# Patient Record
Sex: MALE | Race: BLACK OR AFRICAN AMERICAN | Employment: FULL TIME | URBAN - NONMETROPOLITAN AREA
[De-identification: names, ages, dates, MRNs, and addresses within clinical notes are randomized per-mention and may not be internally consistent; named-entity substitution may affect disease eponyms.]

---

## 2022-08-17 ENCOUNTER — APPOINTMENT (OUTPATIENT)
Dept: CT IMAGING | Age: 27
End: 2022-08-17

## 2022-08-17 ENCOUNTER — HOSPITAL ENCOUNTER (EMERGENCY)
Age: 27
Discharge: HOME | End: 2022-08-18
Attending: EMERGENCY MEDICINE

## 2022-08-17 DIAGNOSIS — K21.9 GASTROESOPHAGEAL REFLUX DISEASE WITHOUT ESOPHAGITIS: Primary | ICD-10-CM

## 2022-08-17 LAB — SARS-COV-2, NAAT: NOT  DETECTED

## 2022-08-17 PROCEDURE — 99283 EMERGENCY DEPT VISIT LOW MDM: CPT

## 2022-08-17 PROCEDURE — 87635 SARS-COV-2 COVID-19 AMP PRB: CPT

## 2022-08-17 PROCEDURE — 6370000000 HC RX 637 (ALT 250 FOR IP): Performed by: EMERGENCY MEDICINE

## 2022-08-17 RX ORDER — PANTOPRAZOLE SODIUM 40 MG/1
40 TABLET, DELAYED RELEASE ORAL ONCE
Status: COMPLETED | OUTPATIENT
Start: 2022-08-17 | End: 2022-08-17

## 2022-08-17 RX ORDER — ONDANSETRON 4 MG/1
4 TABLET, ORALLY DISINTEGRATING ORAL ONCE
Status: COMPLETED | OUTPATIENT
Start: 2022-08-17 | End: 2022-08-17

## 2022-08-17 RX ADMIN — PANTOPRAZOLE SODIUM 40 MG: 40 TABLET, DELAYED RELEASE ORAL at 22:14

## 2022-08-17 RX ADMIN — LIDOCAINE HYDROCHLORIDE: 20 SOLUTION ORAL; TOPICAL at 22:14

## 2022-08-17 RX ADMIN — ONDANSETRON 4 MG: 4 TABLET, ORALLY DISINTEGRATING ORAL at 22:14

## 2022-08-17 ASSESSMENT — ENCOUNTER SYMPTOMS
NAUSEA: 1
WHEEZING: 0
BLOOD IN STOOL: 0
EYE DISCHARGE: 0
COUGH: 0
EYE PAIN: 0
SORE THROAT: 0
EYE ITCHING: 0
ABDOMINAL PAIN: 1
DIARRHEA: 0
CHEST TIGHTNESS: 0
SHORTNESS OF BREATH: 0
CONSTIPATION: 0
PHOTOPHOBIA: 0
VOMITING: 0
EYE REDNESS: 0
VOICE CHANGE: 0
BACK PAIN: 0
SINUS PRESSURE: 0
ABDOMINAL DISTENTION: 0
CHOKING: 0
RHINORRHEA: 0
TROUBLE SWALLOWING: 0

## 2022-08-17 NOTE — LETTER
[facility] [address] [address] EMERGENCY DEPT  [address] [place] [id number]  Phone: [phone]               August 18, 2022    Patient: Any Collins   YOB: 1995   Date of Visit: 8/17/2022       To Whom It May Concern:    Any Collins was seen and treated in our emergency department on 8/17/2022. He may return to work on 08/19/2022.       Sincerely,       Noemi Zarate RN per  [address] [place] [address]        Signature:__________________________________

## 2022-08-18 VITALS
WEIGHT: 180 LBS | HEART RATE: 67 BPM | HEIGHT: 72 IN | BODY MASS INDEX: 24.38 KG/M2 | TEMPERATURE: 98.5 F | SYSTOLIC BLOOD PRESSURE: 159 MMHG | DIASTOLIC BLOOD PRESSURE: 106 MMHG | OXYGEN SATURATION: 99 % | RESPIRATION RATE: 16 BRPM

## 2022-08-18 RX ORDER — PANTOPRAZOLE SODIUM 40 MG/1
40 TABLET, DELAYED RELEASE ORAL
Qty: 30 TABLET | Refills: 0 | Status: SHIPPED | OUTPATIENT
Start: 2022-08-18

## 2022-08-18 RX ORDER — ONDANSETRON 4 MG/1
4 TABLET, ORALLY DISINTEGRATING ORAL 3 TIMES DAILY PRN
Qty: 21 TABLET | Refills: 0 | Status: SHIPPED | OUTPATIENT
Start: 2022-08-18

## 2022-08-18 RX ORDER — CALCIUM CARBONATE 200(500)MG
1 TABLET,CHEWABLE ORAL DAILY
Qty: 30 TABLET | Refills: 0 | Status: SHIPPED | OUTPATIENT
Start: 2022-08-18 | End: 2022-09-17

## 2022-08-18 NOTE — ED PROVIDER NOTES
[facility]  eMERGENCY dEPARTMENT eNCOUnter          CHIEF COMPLAINT       Chief Complaint   Patient presents with    Abdominal Pain         Nurses Notes reviewed and I agree except as noted in the HPI. HISTORY OF PRESENT ILLNESS    J Carlos Keen is a 32 y.o. male who presents abdominal pain. Apparently this is been going on for a week. Patient states that today in the upper quadrants bilaterally and centrally. Patient states he has not had any nausea or vomiting. He has not had diarrhea or constipation. No blood in the stool no blood in the urine. Denies any fevers chills or sweats. Patient states he does not have any trauma. Patient states that he has had no fevers chills sweats. Patient is otherwise resting comfortably on the cot no apparent distress no other physical complaints at this time. He did discuss with me the fact that he does not want lab work done because he does not like needles. I did discuss with him at bedside to fully work this patient up and that he would need to have lab work done and I would like to get a CAT scan. He stated he would think about it. REVIEW OF SYSTEMS     Review of Systems   Constitutional:  Negative for activity change, appetite change, diaphoresis, fatigue and unexpected weight change. HENT:  Negative for congestion, ear discharge, ear pain, hearing loss, rhinorrhea, sinus pressure, sore throat, trouble swallowing and voice change. Eyes:  Negative for photophobia, pain, discharge, redness and itching. Respiratory:  Negative for cough, choking, chest tightness, shortness of breath and wheezing. Cardiovascular:  Negative for chest pain, palpitations and leg swelling. Gastrointestinal:  Positive for abdominal pain and nausea. Negative for abdominal distention, blood in stool, constipation, diarrhea and vomiting. Endocrine: Negative for polydipsia, polyphagia and polyuria.    Genitourinary:  Negative for decreased urine volume, difficulty urinating, dysuria, enuresis, frequency, hematuria, penile pain and urgency. Musculoskeletal:  Negative for arthralgias, back pain, gait problem, myalgias, neck pain and neck stiffness. Skin:  Negative for pallor and rash. Allergic/Immunologic: Negative for immunocompromised state. Neurological:  Negative for dizziness, tremors, seizures, syncope, facial asymmetry, weakness, light-headedness, numbness and headaches. Hematological:  Negative for adenopathy. Does not bruise/bleed easily. Psychiatric/Behavioral:  Negative for agitation, hallucinations and suicidal ideas. The patient is not nervous/anxious. PAST MEDICAL HISTORY    has no past medical history on file. SURGICAL HISTORY      has no past surgical history on file. CURRENT MEDICATIONS       Previous Medications    No medications on file       ALLERGIES     has No Known Allergies. FAMILY HISTORY     has no family status information on file. family history is not on file. SOCIAL HISTORY      reports that he has been smoking cigarettes. He has never used smokeless tobacco. He reports current alcohol use. He reports current drug use. Drug: Marijuana Maurita Handsome). PHYSICAL EXAM     INITIAL VITALS:  height is 6' (1.829 m) and weight is 180 lb (81.6 kg). His oral temperature is 98.5 °F (36.9 °C). His blood pressure is 141/89 (abnormal) and his pulse is 60. His respiration is 18 and oxygen saturation is 99%. Physical Exam  Vitals and nursing note reviewed. Constitutional:       General: He is not in acute distress. Appearance: He is well-developed. He is not diaphoretic. HENT:      Head: Normocephalic and atraumatic. Right Ear: External ear normal.      Left Ear: External ear normal.      Nose: Nose normal.   Eyes:      General: Lids are normal. No scleral icterus. Right eye: No discharge. Left eye: No discharge. Conjunctiva/sclera: Conjunctivae normal.      Right eye: No exudate.      Left eye: No exudate. Pupils: Pupils are equal, round, and reactive to light. Neck:      Thyroid: No thyromegaly. Vascular: No JVD. Trachea: No tracheal deviation. Cardiovascular:      Rate and Rhythm: Normal rate and regular rhythm. Pulses: Normal pulses. Carotid pulses are 2+ on the right side and 2+ on the left side. Radial pulses are 2+ on the right side and 2+ on the left side. Femoral pulses are 2+ on the right side and 2+ on the left side. Popliteal pulses are 2+ on the right side and 2+ on the left side. Dorsalis pedis pulses are 2+ on the right side and 2+ on the left side. Posterior tibial pulses are 2+ on the right side and 2+ on the left side. Heart sounds: Normal heart sounds, S1 normal and S2 normal. No murmur heard. No friction rub. No gallop. Pulmonary:      Effort: Pulmonary effort is normal. No respiratory distress. Breath sounds: Normal breath sounds. No stridor. No decreased breath sounds, wheezing, rhonchi or rales. Chest:      Chest wall: No tenderness. Abdominal:      General: Abdomen is flat. Bowel sounds are normal. There is no distension. Palpations: Abdomen is soft. There is no mass. Tenderness: abdominal tenderness in the epigastric area There is no right CVA tenderness, left CVA tenderness, guarding or rebound. Negative signs include Hernandez's sign, Rovsing's sign, McBurney's sign, psoas sign and obturator sign. Hernia: No hernia is present. Musculoskeletal:         General: No tenderness. Normal range of motion. Cervical back: Normal range of motion and neck supple. Normal range of motion. Right lower leg: No edema. Left lower leg: No edema. Lymphadenopathy:      Cervical: No cervical adenopathy. Skin:     General: Skin is warm and dry. Capillary Refill: Capillary refill takes less than 2 seconds. Findings: No bruising, ecchymosis, lesion or rash.    Neurological: General: No focal deficit present. Mental Status: He is alert and oriented to person, place, and time. Mental status is at baseline. Cranial Nerves: No cranial nerve deficit. Sensory: No sensory deficit. Motor: No weakness. Coordination: Coordination normal.      Gait: Gait normal.      Deep Tendon Reflexes: Reflexes are normal and symmetric. Reflexes normal.   Psychiatric:         Speech: Speech normal.         Behavior: Behavior normal.         Thought Content: Thought content normal.         Judgment: Judgment normal.         DIFFERENTIAL DIAGNOSIS:   Gastritis gastroenteritis GERD pancreatitis duodenitis cholecystitis choledocholithiasis    DIAGNOSTIC RESULTS     EKG: All EKG's are interpreted by the Emergency Department Physician who either signs or Co-signs this chart in the absence of a cardiologist.  None    RADIOLOGY: non-plain film images(s) such as CT, Ultrasound and MRI are read by the radiologist.  CT ABDOMEN PELVIS W IV CONTRAST Additional Contrast? None    (Results Pending)     Declined by patient    LABS:   Labs Reviewed   COVID-19, RAPID   CBC WITH AUTO DIFFERENTIAL   BASIC METABOLIC PANEL   HEPATIC FUNCTION PANEL   LIPASE   TROPONIN   MAGNESIUM   ETHANOL   URINE DRUG SCREEN   URINALYSIS WITH REFLEX TO CULTURE       EMERGENCY DEPARTMENT COURSE:   Vitals:    Vitals:    08/17/22 2209   BP: (!) 141/89   Pulse: 60   Resp: 18   Temp: 98.5 °F (36.9 °C)   TempSrc: Oral   SpO2: 99%   Weight: 180 lb (81.6 kg)   Height: 6' (1.829 m)     Patient was assessed at bedside labs and imaging are ordered. As stated above I had a discussion with the patient and the need to do labs to fully assess this patient's abdominal complaint. Also he would need an IV for a CT scan. Here today the patient stated that he was not sick enough to feel that he needed any labs he did have a COVID test.  He did not want a CAT scan. Here today COVID is negative.   Went back and discussed the fact that the only thing I can tell him was that he did not have COVID. He did report feeling better after the medications were given to him. He had received Zofran Protonix and a GI cocktail. I can only assume that he has either GERD or gastritis. I told him that at bedside. I told him that if he started to feel worse in any way he should come back to the emergency room and reconsider getting the labs and imaging. Patient understood and agreed with the plan. Patient is subsequently discharged home in stable condition. Patient has what appears to be GERD. Patient is instructed to take medications as prescribed. He is instructed to follow-up with a primary care physician to do so within the next 1 to 2 days. He is instructed return to the nearest emergency room immediately for any new or worsening complaints. CRITICAL CARE:   None    CONSULTS:  None    PROCEDURES:  None    FINAL IMPRESSION      1.  Gastroesophageal reflux disease without esophagitis          DISPOSITION/PLAN   Discharge    PATIENT REFERRED TO:  [address]  Call in 2 days      DISCHARGE MEDICATIONS:  New Prescriptions    CALCIUM CARBONATE (ANTACID) 500 MG CHEWABLE TABLET    Take 1 tablet by mouth daily    ONDANSETRON (ZOFRAN-ODT) 4 MG DISINTEGRATING TABLET    Take 1 tablet by mouth 3 times daily as needed for Nausea or Vomiting    PANTOPRAZOLE (PROTONIX) 40 MG TABLET    Take 1 tablet by mouth every morning (before breakfast)       (Please note that portions of this note were completed with a voice recognition program.  Efforts were made to edit the dictations but occasionally words are mis-transcribed.)    DO Victor Hugo Walton DO  08/18/22 0003

## 2022-08-18 NOTE — DISCHARGE INSTR - COC
Continuity of Care Form    Patient Name: No Ledbetter   :  1995  MRN:  [id number]    Admit date:  2022  Discharge date:  ***    Code Status Order: No Order   Advance Directives:     Admitting Physician:  No admitting provider for patient encounter. PCP: No primary care provider on file. Discharging Nurse: [facility] Unit/Room#: 05/005A  Discharging Unit Phone Number: ***    Emergency Contact:   No emergency contact information on file. Past Surgical History:  No past surgical history on file. Immunization History: There is no immunization history on file for this patient. Active Problems: There is no problem list on file for this patient. Isolation/Infection:   Isolation            No Isolation          Patient Infection Status       Infection Onset Added Last Indicated Last Indicated By Review Planned Expiration Resolved Resolved By    COVID-19 (Rule Out) 22 COVID-19, Rapid (Ordered) 22              Nurse Assessment:  Last Vital Signs: There were no vitals taken for this visit. Last documented pain score (0-10 scale):    Last Weight:   Wt Readings from Last 1 Encounters:   No data found for Wt     Mental Status:  {IP PT MENTAL STATUS:65659}    IV Access:  { MARYAM IV ACCESS:610604270}    Nursing Mobility/ADLs:  Walking   {CHP DME YQOB:535029822}  Transfer  {CHP DME ATMD:150433607}  Bathing  {CHP DME YXLF:391144913}  Dressing  {CHP DME XQSF:679428161}  Toileting  {CHP DME ZWWN:737377274}  Feeding  {P DME AKSK:365247448}  Med Admin  {P DME CRF}  Med Delivery   { MARYAM MED Delivery:067753926}    Wound Care Documentation and Therapy:        Elimination:  Continence:    Bowel: {YES / NORMAN:98342}  Bladder: {YES / KH:85908}  Urinary Catheter: {Urinary Catheter:089674435}   Colostomy/Ileostomy/Ileal Conduit: {YES / ES:39972}       Date of Last BM: ***  No intake or output data in the 24 hours ending 22  No intake/output data recorded.     Safety Concerns:     [address]CO Safety Concerns:763669196}    Impairments/Disabilities:      [address] MARYAM Impairments/Disabilities:710453664}    Nutrition Therapy:  Current Nutrition Therapy:   [address] Font[address] MARYAM Diet List:037825746}    Routes of Feeding: {CHP DME Other Feedings:083803755}  Liquids: {Slp liquid thickness:78202}  Daily Fluid Restriction: {CHP DME Yes amt example:351196768}  Last Modified Barium Swallow with Video (Video Swallowing Test): {Done Not Done QFPE:891738060}    Treatments at the Time of Hospital Discharge:   Respiratory Treatments: ***  Oxygen Therapy:  {Therapy; copd oxygen:89836}  Ventilator:    { CC Vent LVD}    Rehab Therapies: {THERAPEUTIC INTERVENTION:4157758358}  Weight Bearing Status/Restrictions: Shahab Fontaine  Weight Bearin}  Other Medical Equipment (for information only, NOT a DME order):  {EQUIPMENT:656214140}  Other Treatments: ***    Patient's personal belongings (please select all that are sent with patient):  {CHP DME Belongings:376850923}    RN SIGNATURE:  {Esignature:791104651}    CASE MANAGEMENT/SOCIAL WORK SECTION    Inpatient Status Date: ***    Readmission Risk Assessment Score:  Readmission Risk              Risk of Unplanned Readmission:  0           Discharging to Facility/ Agency   Name:   Address:  Phone:  Fax:    Dialysis Facility (if applicable)   Name:  Address:  Dialysis Schedule:  Phone:  Fax:    / signature: {Esignature:063140632}    PHYSICIAN SECTION    Prognosis: {Prognosis:7930580341}    Condition at Discharge: [address] Fontaine Patient Condition:605434054}    Rehab Potential (if transferring to Rehab): {Prognosis:1585515967}    Recommended Labs or Other Treatments After Discharge: ***    Physician Certification: I certify the above information and transfer of [facility]  is necessary for the continuing treatment of the diagnosis listed and that he requires {Admit to Appropriate Level of Care:00430} for {GREATER/LESS:351822267} 30 days.      Update Admission H&P: {CHP DME Changes in KZCOI:528901649}    PHYSICIAN SIGNATURE:  {Esignature:324006111}

## 2022-08-18 NOTE — ED TRIAGE NOTES
Pt presents to the ED via triage with c/o abdominal pain. Pt states pain has been going on for a couple days. Pt denies taking anything for pain pta. Pt states pain is on the lower left side and describes it as dull. Pt denies any nausea, vomiting or diarrhea. Pt denies any history of abdominal surgery. Pt states the pain occasionally is a stabbing pain.  RR easy and unlabored

## 2022-08-18 NOTE — DISCHARGE INSTRUCTIONS
Patient has what appears to be GERD. Patient is instructed to take medications as prescribed. He is instructed to follow-up with a primary care physician to do so within the next 1 to 2 days. He is instructed return to the nearest emergency room immediately for any new or worsening complaints.

## 2025-04-11 ENCOUNTER — HOSPITAL ENCOUNTER (EMERGENCY)
Age: 30
Discharge: HOME | End: 2025-04-11

## 2025-09-05 ENCOUNTER — HOSPITAL ENCOUNTER (EMERGENCY)
Age: 30
Discharge: HOME | End: 2025-09-05

## 2025-09-05 VITALS
OXYGEN SATURATION: 97 % | TEMPERATURE: 97.8 F | HEART RATE: 101 BPM | WEIGHT: 220 LBS | BODY MASS INDEX: 29.16 KG/M2 | DIASTOLIC BLOOD PRESSURE: 100 MMHG | HEIGHT: 73 IN | RESPIRATION RATE: 16 BRPM | SYSTOLIC BLOOD PRESSURE: 155 MMHG

## 2025-09-05 DIAGNOSIS — K02.9 PAIN DUE TO DENTAL CARIES: ICD-10-CM

## 2025-09-05 DIAGNOSIS — K08.89 PAIN, DENTAL: Primary | ICD-10-CM

## 2025-09-05 PROCEDURE — 99213 OFFICE O/P EST LOW 20 MIN: CPT

## 2025-09-05 RX ORDER — LIDOCAINE HYDROCHLORIDE 20 MG/ML
10 SOLUTION OROPHARYNGEAL
Qty: 100 ML | Refills: 0 | Status: SHIPPED | OUTPATIENT
Start: 2025-09-05

## 2025-09-05 RX ORDER — IBUPROFEN 600 MG/1
600 TABLET, FILM COATED ORAL 3 TIMES DAILY PRN
Qty: 30 TABLET | Refills: 0 | Status: SHIPPED | OUTPATIENT
Start: 2025-09-05